# Patient Record
Sex: MALE | Race: WHITE | Employment: OTHER | URBAN - NONMETROPOLITAN AREA
[De-identification: names, ages, dates, MRNs, and addresses within clinical notes are randomized per-mention and may not be internally consistent; named-entity substitution may affect disease eponyms.]

---

## 2018-02-13 ENCOUNTER — PATIENT OUTREACH (OUTPATIENT)
Dept: FAMILY MEDICINE CLINIC | Facility: CLINIC | Age: 63
End: 2018-02-13

## 2018-07-12 ENCOUNTER — TELEPHONE (OUTPATIENT)
Dept: FAMILY MEDICINE CLINIC | Facility: CLINIC | Age: 63
End: 2018-07-12

## 2018-07-20 ENCOUNTER — TELEPHONE (OUTPATIENT)
Dept: FAMILY MEDICINE CLINIC | Facility: CLINIC | Age: 63
End: 2018-07-20

## 2018-07-20 NOTE — TELEPHONE ENCOUNTER
DR Ramona Chaudhari AT [facility] ER WANTS TO SPEAK TO COVERING PROVIDER ABOUT THIS PATIENT AND HIS LEG WOUND

## 2018-07-20 NOTE — TELEPHONE ENCOUNTER
Dr. Coby Nicole spoke with Dr. Jennifer Soto. Dr. Jennifer Soto would like to see patient tomorrow at 8:15 AM. Called and spoke with patient's wife, she states that they will be here tomorrow at 8:15Am.    Sheila Espinal, 07/20/18, 2:05 PM

## 2018-07-21 ENCOUNTER — OFFICE VISIT (OUTPATIENT)
Dept: FAMILY MEDICINE CLINIC | Facility: CLINIC | Age: 63
End: 2018-07-21
Payer: COMMERCIAL

## 2018-07-21 VITALS
HEART RATE: 65 BPM | TEMPERATURE: 98 F | OXYGEN SATURATION: 97 % | SYSTOLIC BLOOD PRESSURE: 122 MMHG | DIASTOLIC BLOOD PRESSURE: 84 MMHG

## 2018-07-21 DIAGNOSIS — S91.105D OPEN WOUND OF FOURTH TOE OF LEFT FOOT, SUBSEQUENT ENCOUNTER: ICD-10-CM

## 2018-07-21 DIAGNOSIS — L03.032 CELLULITIS OF FOURTH TOE OF LEFT FOOT: Primary | ICD-10-CM

## 2018-07-21 PROCEDURE — 99214 OFFICE O/P EST MOD 30 MIN: CPT | Performed by: FAMILY MEDICINE

## 2018-07-21 RX ORDER — AMOXICILLIN AND CLAVULANATE POTASSIUM 875; 125 MG/1; MG/1
1 TABLET, FILM COATED ORAL 2 TIMES DAILY
COMMUNITY
Start: 2018-07-20 | End: 2018-08-06 | Stop reason: ALTCHOICE

## 2018-07-21 NOTE — PROGRESS NOTES
HPI:    Patient ID: Ame Almaguer is a 65year old male.   Pt seen yest ER w/ cellulitis / open wound L 4th toe/foot  IV antibiotic / oral  Slight decrease swelling / erythema   Pain ok  W/o fever / chills  HPI    Review of Systems         Current Outpat

## 2018-07-21 NOTE — PATIENT INSTRUCTIONS
Open air / elevate  Soak TID x 10 min  Massage  F/u Monday Dr Mckeon Nancy  meds as directed  Call ?  Or problems

## 2018-07-23 ENCOUNTER — OFFICE VISIT (OUTPATIENT)
Dept: FAMILY MEDICINE CLINIC | Facility: CLINIC | Age: 63
End: 2018-07-23
Payer: COMMERCIAL

## 2018-07-23 VITALS
OXYGEN SATURATION: 99 % | HEART RATE: 74 BPM | TEMPERATURE: 99 F | WEIGHT: 218 LBS | SYSTOLIC BLOOD PRESSURE: 128 MMHG | HEIGHT: 68 IN | DIASTOLIC BLOOD PRESSURE: 80 MMHG | BODY MASS INDEX: 33.04 KG/M2

## 2018-07-23 DIAGNOSIS — L03.032 CELLULITIS OF FOURTH TOE OF LEFT FOOT: Primary | ICD-10-CM

## 2018-07-23 DIAGNOSIS — S91.105D OPEN WOUND OF FOURTH TOE OF LEFT FOOT, SUBSEQUENT ENCOUNTER: ICD-10-CM

## 2018-07-23 DIAGNOSIS — B35.3 TINEA PEDIS OF LEFT FOOT: ICD-10-CM

## 2018-07-23 PROCEDURE — 99213 OFFICE O/P EST LOW 20 MIN: CPT | Performed by: FAMILY MEDICINE

## 2018-07-23 NOTE — PATIENT INSTRUCTIONS
I reviewed ER reports from previous visits. Reviewed local wound care instructions.   Clean with soap and water, apply bacitracin antibiotic ointment and keep covered  Elevate foot at rest  Apply clotrimazole 1% cream thinly to interdigital webspaces 2-3 t

## 2018-07-23 NOTE — PROGRESS NOTES
HPI:    Patient ID: Fernando Weber is a 65year old male. Patient presents with:  ER F/U: cellulitis  here with daughter  HPI pt was seen at [facility] emergency room 7/20/18 for cellulitis and ulceration of left fourth toe.   X-rays Cellulitis of fourth toe of left foot  (primary encounter diagnosis)  Open wound of fourth toe of left foot, subsequent encounter  Tinea pedis of left foot  Patient Instructions   I reviewed ER reports from previous visits.   Reviewed local wound care instr

## 2018-07-24 ENCOUNTER — TELEPHONE (OUTPATIENT)
Dept: FAMILY MEDICINE CLINIC | Facility: CLINIC | Age: 63
End: 2018-07-24

## 2018-07-24 NOTE — TELEPHONE ENCOUNTER
Pt calls with an update on his toe. States he soaked it at 6:30am today, put Bacitracin on it and covered it. Reports that @ 11am, he had some thick yellow pus come out----it took 4 Q-tips to clean it.    Reports the swelling in his foot has gone down, but

## 2018-07-24 NOTE — TELEPHONE ENCOUNTER
YAQUELIN IS SUPPOSE TO CALL WITH AN UPDATE ON HIS TOE, YELLOW PUSS COMING OUT OF IT.   HE JUST GOT DONE SOAKING IT.  HE WOULD LIKE TO SPEAK TO THE NURSE

## 2018-07-25 ENCOUNTER — TELEPHONE (OUTPATIENT)
Dept: FAMILY MEDICINE CLINIC | Facility: CLINIC | Age: 63
End: 2018-07-25

## 2018-07-25 ENCOUNTER — OFFICE VISIT (OUTPATIENT)
Dept: FAMILY MEDICINE CLINIC | Facility: CLINIC | Age: 63
End: 2018-07-25
Payer: COMMERCIAL

## 2018-07-25 VITALS
TEMPERATURE: 98 F | BODY MASS INDEX: 33 KG/M2 | SYSTOLIC BLOOD PRESSURE: 128 MMHG | WEIGHT: 218 LBS | HEART RATE: 72 BPM | DIASTOLIC BLOOD PRESSURE: 80 MMHG | OXYGEN SATURATION: 98 %

## 2018-07-25 DIAGNOSIS — L03.032 CELLULITIS OF FOURTH TOE OF LEFT FOOT: Primary | ICD-10-CM

## 2018-07-25 DIAGNOSIS — S91.105D OPEN WOUND OF FOURTH TOE OF LEFT FOOT, SUBSEQUENT ENCOUNTER: ICD-10-CM

## 2018-07-25 DIAGNOSIS — B35.3 TINEA PEDIS OF LEFT FOOT: ICD-10-CM

## 2018-07-25 PROCEDURE — 99213 OFFICE O/P EST LOW 20 MIN: CPT | Performed by: FAMILY MEDICINE

## 2018-07-25 RX ORDER — CLINDAMYCIN HYDROCHLORIDE 150 MG/1
150 CAPSULE ORAL 3 TIMES DAILY
Qty: 30 CAPSULE | Refills: 0 | Status: SHIPPED | OUTPATIENT
Start: 2018-07-25 | End: 2018-08-04

## 2018-07-25 RX ORDER — LEVOFLOXACIN 500 MG/1
500 TABLET, FILM COATED ORAL DAILY
Qty: 10 TABLET | Refills: 0 | Status: SHIPPED | OUTPATIENT
Start: 2018-07-25 | End: 2018-08-04

## 2018-07-25 NOTE — PROGRESS NOTES
HPI:    Patient ID: Josee Perez is a 65year old male. Patient presents with: Follow - Up: cellulitis- toe    HPI patient has been on Augmentin since 7/20/18 for left fourth toe cellulitis with ulceration.   There is been significant improvement in s We will switch antibiotics from Augmentin to clindamycin 150 mg 3 times daily and Levaquin 500 mg daily  Appointment facilitated with Dr. Sam Encinas for podiatry evaluation tomorrow  Keep foot elevated, continue warm water soaks and bacitracin antibiotic

## 2018-07-25 NOTE — PATIENT INSTRUCTIONS
We will switch antibiotics from Augmentin to clindamycin 150 mg 3 times daily and Levaquin 500 mg daily  Appointment facilitated with Dr. Kristin Correa for podiatry evaluation tomorrow  Keep foot elevated, continue warm water soaks and bacitracin antibiotic

## 2018-07-26 ENCOUNTER — TELEPHONE (OUTPATIENT)
Dept: FAMILY MEDICINE CLINIC | Facility: CLINIC | Age: 63
End: 2018-07-26

## 2018-07-26 NOTE — TELEPHONE ENCOUNTER
Called and spoke to patient he is agreeable with this plan, and will wait to see Dr. Mirtha Varela this afternoon.

## 2018-07-26 NOTE — TELEPHONE ENCOUNTER
Wait until he sees Dr Manjit Colon later today. I think the redness and burning in the foot are related to swelling and infection rather than antibiotic however the hives may be a different story.   Hold off on more clindamycin until he sees the podiatrist.  Luis Love

## 2018-07-26 NOTE — TELEPHONE ENCOUNTER
PT. HAVING A ALLERGIC REACTION TO THE MEDS HE GOT YESTERDAY FROM DR. MG/PT. WAS UP ALL NIGHT LONG. BURNING-ITCHING SENSATION ON HIS BAD FOOT AND LFT FOREARM.   NO ISSUES WITH HIS BREATHING AND NO SWELLING IN HIS FACE

## 2018-08-06 ENCOUNTER — OFFICE VISIT (OUTPATIENT)
Dept: FAMILY MEDICINE CLINIC | Facility: CLINIC | Age: 63
End: 2018-08-06
Payer: COMMERCIAL

## 2018-08-06 VITALS
OXYGEN SATURATION: 99 % | DIASTOLIC BLOOD PRESSURE: 70 MMHG | WEIGHT: 208 LBS | SYSTOLIC BLOOD PRESSURE: 130 MMHG | BODY MASS INDEX: 32 KG/M2 | TEMPERATURE: 98 F | HEART RATE: 76 BPM

## 2018-08-06 DIAGNOSIS — A49.02 MRSA INFECTION: ICD-10-CM

## 2018-08-06 DIAGNOSIS — K14.0 GLOSSITIS: ICD-10-CM

## 2018-08-06 DIAGNOSIS — L03.032 CELLULITIS OF FOURTH TOE OF LEFT FOOT: Primary | ICD-10-CM

## 2018-08-06 PROBLEM — Z22.322 MRSA (METHICILLIN RESISTANT STAPH AUREUS) CULTURE POSITIVE: Status: ACTIVE | Noted: 2018-07-30

## 2018-08-06 PROCEDURE — 1111F DSCHRG MED/CURRENT MED MERGE: CPT | Performed by: FAMILY MEDICINE

## 2018-08-06 PROCEDURE — 99213 OFFICE O/P EST LOW 20 MIN: CPT | Performed by: FAMILY MEDICINE

## 2018-08-06 RX ORDER — LINEZOLID 600 MG/1
1 TABLET, FILM COATED ORAL 2 TIMES DAILY
Refills: 0 | COMMUNITY
Start: 2018-07-31 | End: 2018-08-13

## 2018-08-06 RX ORDER — ACETAMINOPHEN AND CODEINE PHOSPHATE 300; 30 MG/1; MG/1
TABLET ORAL
Refills: 0 | COMMUNITY
Start: 2018-07-30 | End: 2019-04-12 | Stop reason: ALTCHOICE

## 2018-08-06 NOTE — PROGRESS NOTES
HPI:    Patient ID: Akin Wu is a 65year old male.   Patient presents with:  Hospital F/U: toe infection    HPI patient was admitted to [facility] 7/26 through 7/34 persistent cellulitis of left fourth toe despite oral outpatient Skin: Skin is warm and dry. Psychiatric: He has a normal mood and affect. Blood pressure 130/70, pulse 76, temperature 98.4 °F (36.9 °C), temperature source Temporal, weight 208 lb, SpO2 99 %.          ASSESSMENT/PLAN:   Cellulitis of fourth toe of left

## 2018-08-06 NOTE — PATIENT INSTRUCTIONS
Finish antibiotics  Probiotic daily  Vitamin C 2000 mg daily  Continue daily dressing changes and wound care. Follow-up with specialist as scheduled.   Activity per podiatry

## 2019-03-12 ENCOUNTER — OFFICE VISIT (OUTPATIENT)
Dept: FAMILY MEDICINE CLINIC | Facility: CLINIC | Age: 64
End: 2019-03-12
Payer: COMMERCIAL

## 2019-03-12 VITALS
HEIGHT: 68 IN | BODY MASS INDEX: 33.04 KG/M2 | HEART RATE: 67 BPM | OXYGEN SATURATION: 97 % | SYSTOLIC BLOOD PRESSURE: 110 MMHG | DIASTOLIC BLOOD PRESSURE: 88 MMHG | TEMPERATURE: 98 F | WEIGHT: 218 LBS

## 2019-03-12 DIAGNOSIS — J40 BRONCHITIS: ICD-10-CM

## 2019-03-12 DIAGNOSIS — J01.00 ACUTE NON-RECURRENT MAXILLARY SINUSITIS: Primary | ICD-10-CM

## 2019-03-12 PROCEDURE — 99214 OFFICE O/P EST MOD 30 MIN: CPT | Performed by: FAMILY MEDICINE

## 2019-03-12 RX ORDER — CODEINE PHOSPHATE AND GUAIFENESIN 10; 100 MG/5ML; MG/5ML
SOLUTION ORAL
Qty: 120 ML | Refills: 0 | Status: SHIPPED | OUTPATIENT
Start: 2019-03-12 | End: 2019-04-12 | Stop reason: ALTCHOICE

## 2019-03-12 RX ORDER — METHYLPREDNISOLONE 4 MG/1
TABLET ORAL
Qty: 1 KIT | Refills: 0 | Status: SHIPPED | OUTPATIENT
Start: 2019-03-12 | End: 2019-04-12 | Stop reason: ALTCHOICE

## 2019-03-12 RX ORDER — AMOXICILLIN AND CLAVULANATE POTASSIUM 875; 125 MG/1; MG/1
1 TABLET, FILM COATED ORAL 2 TIMES DAILY
Qty: 20 TABLET | Refills: 0 | Status: SHIPPED | OUTPATIENT
Start: 2019-03-12 | End: 2019-03-22

## 2019-03-12 NOTE — PROGRESS NOTES
Jose Maria Urbina is a 59year old male. Patient presents with: Other: started 3/2. tried nyquil, dayquil, cough drops, and mucinex. bad cough.  nothing helped in room 1      HPI:   Patient has had a 2-week history of sinus congestion, postnasal drip, coug (36.4 °C) (Temporal)   Ht 68\"   Wt 218 lb   SpO2 97%   BMI 33.15 kg/m²   GENERAL: well developed, well nourished,in no apparent distress  SKIN: no rashes,no suspicious lesions  HEENT: atraumatic, normocephalic, R TM normal, L TM normal, with PND normal  N MCG/ACT Inhalation Aerosol Powder, Breath Activated 1 each 0     Sig: Inhale 1 puff into the lungs 4 (four) times daily.        Imaging & Consults:  None

## 2019-03-15 ENCOUNTER — PATIENT OUTREACH (OUTPATIENT)
Dept: FAMILY MEDICINE CLINIC | Facility: CLINIC | Age: 64
End: 2019-03-15

## 2019-04-12 ENCOUNTER — OFFICE VISIT (OUTPATIENT)
Dept: FAMILY MEDICINE CLINIC | Facility: CLINIC | Age: 64
End: 2019-04-12
Payer: COMMERCIAL

## 2019-04-12 VITALS
SYSTOLIC BLOOD PRESSURE: 136 MMHG | TEMPERATURE: 98 F | BODY MASS INDEX: 33 KG/M2 | HEART RATE: 86 BPM | DIASTOLIC BLOOD PRESSURE: 86 MMHG | WEIGHT: 218 LBS

## 2019-04-12 DIAGNOSIS — F51.04 PSYCHOPHYSIOLOGICAL INSOMNIA: ICD-10-CM

## 2019-04-12 DIAGNOSIS — L73.9 FOLLICULITIS: Primary | ICD-10-CM

## 2019-04-12 PROBLEM — Z22.322 MRSA (METHICILLIN RESISTANT STAPH AUREUS) CULTURE POSITIVE: Status: RESOLVED | Noted: 2018-07-30 | Resolved: 2019-04-12

## 2019-04-12 PROCEDURE — 99214 OFFICE O/P EST MOD 30 MIN: CPT | Performed by: FAMILY MEDICINE

## 2019-04-12 RX ORDER — SULFAMETHOXAZOLE AND TRIMETHOPRIM 800; 160 MG/1; MG/1
1 TABLET ORAL 2 TIMES DAILY
Qty: 20 TABLET | Refills: 0 | Status: SHIPPED | OUTPATIENT
Start: 2019-04-12 | End: 2019-04-22

## 2019-04-12 RX ORDER — TRAMADOL HYDROCHLORIDE 50 MG/1
50 TABLET ORAL EVERY 6 HOURS PRN
COMMUNITY

## 2019-04-12 RX ORDER — TRAZODONE HYDROCHLORIDE 50 MG/1
50 TABLET ORAL NIGHTLY
Qty: 30 TABLET | Refills: 1 | Status: SHIPPED | OUTPATIENT
Start: 2019-04-12 | End: 2019-05-12

## 2019-04-12 NOTE — PROGRESS NOTES
HPI:    Patient ID: Huseyin Larios is a 59year old male.     Patient presents with:  Bump: increase in amount, under right armpit    X 2 weeks c/o sores under right arm, no fevers, chills, dc,   Used Dakins solution  Wife pulled out an ingrown hair and s Inhale 1 puff into the lungs 4 (four) times daily. Disp: 1 each Rfl: 0     Allergies:  Vicodin [Hydrocodon*    OTHER (SEE COMMENTS)    Comment:Other reaction(s):  Other (See Comments)             Suicidal thoughts             Suicidal  Clindamycin (two) times daily for 10 days. • traZODone HCl 50 MG Oral Tab 30 tablet 1     Sig: Take 1 tablet (50 mg total) by mouth nightly.        Imaging & Referrals:  None       #[id number]

## 2019-04-12 NOTE — PATIENT INSTRUCTIONS
We will treat empirically for MRSA infection.   Start Bactrim DS twice daily times 10 days  Recommend warm compresses to right axilla 2-3 times daily, avoid puncturing or attempting to express any of the lesions  Infection control measures reviewed  Discuss

## 2019-10-02 ENCOUNTER — PATIENT OUTREACH (OUTPATIENT)
Dept: FAMILY MEDICINE CLINIC | Facility: CLINIC | Age: 64
End: 2019-10-02

## 2022-03-09 ENCOUNTER — OFFICE VISIT (OUTPATIENT)
Dept: FAMILY MEDICINE CLINIC | Facility: CLINIC | Age: 67
End: 2022-03-09
Payer: COMMERCIAL

## 2022-03-09 ENCOUNTER — TELEPHONE (OUTPATIENT)
Dept: FAMILY MEDICINE CLINIC | Facility: CLINIC | Age: 67
End: 2022-03-09

## 2022-03-09 VITALS
WEIGHT: 211 LBS | BODY MASS INDEX: 31.98 KG/M2 | SYSTOLIC BLOOD PRESSURE: 122 MMHG | RESPIRATION RATE: 18 BRPM | DIASTOLIC BLOOD PRESSURE: 80 MMHG | HEART RATE: 61 BPM | HEIGHT: 68 IN | OXYGEN SATURATION: 98 % | TEMPERATURE: 98 F

## 2022-03-09 DIAGNOSIS — M25.561 ACUTE PAIN OF RIGHT KNEE: Primary | ICD-10-CM

## 2022-03-09 DIAGNOSIS — M17.11 PRIMARY OSTEOARTHRITIS OF RIGHT KNEE: ICD-10-CM

## 2022-03-09 DIAGNOSIS — M76.51 PATELLAR TENDINITIS OF RIGHT KNEE: ICD-10-CM

## 2022-03-09 PROCEDURE — 3079F DIAST BP 80-89 MM HG: CPT | Performed by: FAMILY MEDICINE

## 2022-03-09 PROCEDURE — 3074F SYST BP LT 130 MM HG: CPT | Performed by: FAMILY MEDICINE

## 2022-03-09 PROCEDURE — 3008F BODY MASS INDEX DOCD: CPT | Performed by: FAMILY MEDICINE

## 2022-03-09 PROCEDURE — E0114 CRUTCH UNDERARM PAIR NO WOOD: HCPCS | Performed by: FAMILY MEDICINE

## 2022-03-09 PROCEDURE — 99214 OFFICE O/P EST MOD 30 MIN: CPT | Performed by: FAMILY MEDICINE

## 2022-03-09 RX ORDER — TRAMADOL HYDROCHLORIDE 50 MG/1
50 TABLET ORAL EVERY 6 HOURS PRN
Refills: 0 | Status: CANCELLED | OUTPATIENT
Start: 2022-03-09

## 2022-03-09 RX ORDER — TRAMADOL HYDROCHLORIDE 50 MG/1
50 TABLET ORAL EVERY 6 HOURS PRN
Qty: 28 TABLET | Refills: 0 | Status: SHIPPED | OUTPATIENT
Start: 2022-03-09

## 2022-03-09 NOTE — PATIENT INSTRUCTIONS
Patient provided crutches for offloading with ambulation. Crutches were fitted for appropriate height and patient was able to demonstrate safe ambulation using crutches  We will send patient to [facility] for right knee x-ray and facilitate appointment with orthopedics. Patient appears uncomfortable out of proportion with physical findings. I suspect meniscal injury. Patient is scheduled for orthopedic evaluation tomorrow with Dr. Corey Portillo at [facility]  Patient offered injectable Toradol and declined. He may use ice packs for 10 minutes every 4 hours, avoid direct ice to the skin  May use ibuprofen up to 800 mg 3 times daily taken with food as needed for mild to moderate pain and tramadol 50 mg up to every 6 hours for moderate to severe pain.

## 2022-03-09 NOTE — TELEPHONE ENCOUNTER
Please advise patient that his x-ray does not show any evidence of fracture. There is significant osteoarthritic changes, no significant fluid collection    Saleem Carroll

## 2022-03-10 ENCOUNTER — TELEPHONE (OUTPATIENT)
Dept: FAMILY MEDICINE CLINIC | Facility: CLINIC | Age: 67
End: 2022-03-10

## 2022-03-10 NOTE — TELEPHONE ENCOUNTER
CAN NOT GET MRI DONE UNTIL 5PM IN [place], CAN HE GET IT DONE SOONER @ [facility] OR SOMEWHERE AROUND HERE TODAY?

## 2022-03-10 NOTE — TELEPHONE ENCOUNTER
DEE---spoke with pt's wife. Pt saw Dr. Tobias Madden this morning, is scheduled for MRI @ [facility] at 5pm today and will f/u with Dr. Tobias Madden on 3/15.     (disregard her message below---she called [facility] and couldn't schedule there until Tues)

## 2025-03-31 ENCOUNTER — PATIENT OUTREACH (OUTPATIENT)
Dept: CASE MANAGEMENT | Age: 70
End: 2025-03-31

## 2025-03-31 NOTE — PROCEDURES
The office order for PCP removal request is Approved and finalized on March 31, 2025.    Removed Magdaleno Bazan DO as the patient's Primary Care Physician